# Patient Record
Sex: MALE | Race: WHITE | Employment: FULL TIME | URBAN - NONMETROPOLITAN AREA
[De-identification: names, ages, dates, MRNs, and addresses within clinical notes are randomized per-mention and may not be internally consistent; named-entity substitution may affect disease eponyms.]

---

## 2020-03-22 ENCOUNTER — HOSPITAL ENCOUNTER (EMERGENCY)
Age: 40
Discharge: HOME | End: 2020-03-22
Attending: EMERGENCY MEDICINE

## 2020-03-22 ENCOUNTER — APPOINTMENT (OUTPATIENT)
Dept: GENERAL RADIOLOGY | Age: 40
End: 2020-03-22

## 2020-03-22 VITALS
OXYGEN SATURATION: 99 % | RESPIRATION RATE: 18 BRPM | WEIGHT: 220 LBS | DIASTOLIC BLOOD PRESSURE: 79 MMHG | HEART RATE: 98 BPM | SYSTOLIC BLOOD PRESSURE: 133 MMHG | HEIGHT: 74 IN | BODY MASS INDEX: 28.23 KG/M2 | TEMPERATURE: 96 F

## 2020-03-22 PROCEDURE — 2709999900 HC NON-CHARGEABLE SUPPLY

## 2020-03-22 PROCEDURE — 6370000000 HC RX 637 (ALT 250 FOR IP): Performed by: EMERGENCY MEDICINE

## 2020-03-22 PROCEDURE — 73590 X-RAY EXAM OF LOWER LEG: CPT

## 2020-03-22 PROCEDURE — 99284 EMERGENCY DEPT VISIT MOD MDM: CPT

## 2020-03-22 PROCEDURE — L1830 KO IMMOB CANVAS LONG PRE OTS: HCPCS

## 2020-03-22 RX ORDER — CYCLOBENZAPRINE HCL 10 MG
10 TABLET ORAL ONCE
Status: COMPLETED | OUTPATIENT
Start: 2020-03-22 | End: 2020-03-22

## 2020-03-22 RX ORDER — CYCLOBENZAPRINE HCL 10 MG
10 TABLET ORAL 3 TIMES DAILY PRN
Qty: 30 TABLET | Refills: 0 | Status: SHIPPED | OUTPATIENT
Start: 2020-03-22 | End: 2020-04-01

## 2020-03-22 RX ADMIN — CYCLOBENZAPRINE 10 MG: 10 TABLET, FILM COATED ORAL at 10:13

## 2020-03-22 ASSESSMENT — PAIN DESCRIPTION - DESCRIPTORS: DESCRIPTORS: SPASM

## 2020-03-22 ASSESSMENT — ENCOUNTER SYMPTOMS: BACK PAIN: 0

## 2020-03-22 ASSESSMENT — PAIN SCALES - GENERAL: PAINLEVEL_OUTOF10: 9

## 2020-03-22 NOTE — ED NOTES
AVS rev'd with pt. And copy given. Pulse regular. Extremities warm. Respirations regular and quiet. Mucous membranes pink & moist. Alert and oriented times 3. No nausea or vomiting. Range of motion within patient's limits. Skin pink, warm and dry. Calm and cooperative.      Lizbeth Burks RN  03/22/20 [id number]

## 2020-03-22 NOTE — ED PROVIDER NOTES
kg/m²      Physical Exam  Vitals signs and nursing note reviewed. Constitutional:       General: He is not in acute distress. HENT:      Head: Atraumatic. Cardiovascular:      Pulses: Normal pulses. Musculoskeletal:         General: Swelling and tenderness (left proximal lateral lower leg, no bruising or heat. Distal sensation intact, pulses intact. ) present. Skin:     General: Skin is warm and dry. Neurological:      General: No focal deficit present. Mental Status: He is alert and oriented to person, place, and time. Psychiatric:         Behavior: Behavior normal.         RADIOLOGY:    XR TIBIA FIBULA LEFT (2 VIEWS)   Final Result      Acute nondisplaced fracture of the proximal fibula. **This report has been created using voice recognition software. It may contain minor errors which are inherent in voice recognition technology. **      Final report electronically signed by Dr. Alvin Brewer on 3/22/2020 10:09 AM          Vitals:    Vitals:    03/22/20 0924   BP: 133/79   Pulse: 98   Resp: 18   Temp: 96 °F (35.6 °C)   TempSrc: Temporal   SpO2: 99%   Weight: 220 lb (99.8 kg)   Height: 6' 2\" (1.88 m)       EMERGENCY DEPARTMENT COURSE:    Notified of X-ray results, already took Ibuprofen prior to arrival. Flexeril given for muscle spasms. Knee immobilizer applied to the left knee by the nurse and me, confirmed appropriate by me. Normal neurovascular status before and after placement. Crutches given for non-weightbearing left foot, instructed to go to [facility] walk-in clinic tomorrow for follow up. FINAL IMPRESSION      1. Closed torus fracture of proximal end of left fibula, initial encounter        DISPOSITION/PLAN    DISPOSITION Decision To Discharge 03/22/2020 10:00:48 AM      PATIENT REFERRED TO:    Rhiannon [address]el [address] [place], [place] [phone]    go to [facility] between 7 AM and 5 PM tomorrow for orthopedic care.       DISCHARGE